# Patient Record
Sex: MALE | Race: WHITE | NOT HISPANIC OR LATINO | URBAN - METROPOLITAN AREA
[De-identification: names, ages, dates, MRNs, and addresses within clinical notes are randomized per-mention and may not be internally consistent; named-entity substitution may affect disease eponyms.]

---

## 2017-07-12 ENCOUNTER — HOSPITAL ENCOUNTER (OUTPATIENT)
Dept: CT IMAGING | Facility: CLINIC | Age: 82
Discharge: HOME | End: 2017-07-12
Attending: PREVENTIVE MEDICINE | Admitting: PREVENTIVE MEDICINE

## 2017-07-12 DIAGNOSIS — Z00.6 RESEARCH EXAM: ICD-10-CM

## 2017-07-12 PROCEDURE — 71260 CT THORAX DX C+: CPT | Mod: TC

## 2021-02-10 ENCOUNTER — RECORDS - HEALTHEAST (OUTPATIENT)
Dept: LAB | Facility: CLINIC | Age: 86
End: 2021-02-10

## 2021-02-10 ENCOUNTER — AMBULATORY - HEALTHEAST (OUTPATIENT)
Dept: ADMINISTRATIVE | Facility: CLINIC | Age: 86
End: 2021-02-10

## 2021-02-10 ENCOUNTER — OFFICE VISIT - HEALTHEAST (OUTPATIENT)
Dept: GERIATRICS | Facility: CLINIC | Age: 86
End: 2021-02-10

## 2021-02-10 DIAGNOSIS — S82.891D CLOSED FRACTURE OF RIGHT ANKLE WITH ROUTINE HEALING, SUBSEQUENT ENCOUNTER: ICD-10-CM

## 2021-02-10 DIAGNOSIS — R30.0 BURNING WITH URINATION: ICD-10-CM

## 2021-02-10 DIAGNOSIS — R52 PAIN MANAGEMENT: ICD-10-CM

## 2021-02-10 DIAGNOSIS — R53.81 PHYSICAL DECONDITIONING: ICD-10-CM

## 2021-02-10 RX ORDER — ACETAMINOPHEN 325 MG/1
650 TABLET ORAL EVERY 6 HOURS PRN
Status: SHIPPED | COMMUNITY
Start: 2021-02-10

## 2021-02-10 RX ORDER — CARBOXYMETHYLCELLULOSE SODIUM 5 MG/ML
2 SOLUTION/ DROPS OPHTHALMIC 4 TIMES DAILY
Status: SHIPPED | COMMUNITY
Start: 2021-02-10

## 2021-02-10 RX ORDER — FOLIC ACID 1 MG/1
1 TABLET ORAL DAILY
Status: SHIPPED | COMMUNITY
Start: 2021-02-10

## 2021-02-10 RX ORDER — OMEGA-3-ACID ETHYL ESTERS 1 G/1
1 CAPSULE, LIQUID FILLED ORAL DAILY
Status: SHIPPED | COMMUNITY
Start: 2021-02-10

## 2021-02-10 RX ORDER — BRIMONIDINE TARTRATE AND TIMOLOL MALEATE 2; 5 MG/ML; MG/ML
1 SOLUTION OPHTHALMIC 2 TIMES DAILY
Status: SHIPPED | COMMUNITY
Start: 2021-02-10

## 2021-02-10 RX ORDER — AMOXICILLIN 250 MG
1 CAPSULE ORAL 2 TIMES DAILY
Status: SHIPPED | COMMUNITY
Start: 2021-02-10

## 2021-02-10 RX ORDER — PYRIDOXINE HCL (VITAMIN B6) 50 MG
50 TABLET ORAL DAILY
Status: SHIPPED | COMMUNITY
Start: 2021-02-10

## 2021-02-11 LAB
ANION GAP SERPL CALCULATED.3IONS-SCNC: 4 MMOL/L (ref 5–18)
BUN SERPL-MCNC: 26 MG/DL (ref 8–28)
CALCIUM SERPL-MCNC: 8.3 MG/DL (ref 8.5–10.5)
CHLORIDE BLD-SCNC: 108 MMOL/L (ref 98–107)
CO2 SERPL-SCNC: 26 MMOL/L (ref 22–31)
CREAT SERPL-MCNC: 0.83 MG/DL (ref 0.7–1.3)
ERYTHROCYTE [DISTWIDTH] IN BLOOD BY AUTOMATED COUNT: 12.8 % (ref 11–14.5)
GFR SERPL CREATININE-BSD FRML MDRD: >60 ML/MIN/1.73M2
GLUCOSE BLD-MCNC: 107 MG/DL (ref 70–125)
HBA1C MFR BLD: 5.6 %
HCT VFR BLD AUTO: 33.2 % (ref 40–54)
HGB BLD-MCNC: 10.9 G/DL (ref 14–18)
MCH RBC QN AUTO: 29.7 PG (ref 27–34)
MCHC RBC AUTO-ENTMCNC: 32.8 G/DL (ref 32–36)
MCV RBC AUTO: 91 FL (ref 80–100)
PLATELET # BLD AUTO: 105 THOU/UL (ref 140–440)
PMV BLD AUTO: 11.4 FL (ref 8.5–12.5)
POTASSIUM BLD-SCNC: 4.3 MMOL/L (ref 3.5–5)
RBC # BLD AUTO: 3.67 MILL/UL (ref 4.4–6.2)
SODIUM SERPL-SCNC: 138 MMOL/L (ref 136–145)
WBC: 3.3 THOU/UL (ref 4–11)

## 2021-02-12 ENCOUNTER — COMMUNICATION - HEALTHEAST (OUTPATIENT)
Dept: GERIATRICS | Facility: CLINIC | Age: 86
End: 2021-02-12

## 2021-02-12 LAB — 25(OH)D3 SERPL-MCNC: 80.7 NG/ML (ref 30–80)

## 2021-02-15 ENCOUNTER — OFFICE VISIT - HEALTHEAST (OUTPATIENT)
Dept: GERIATRICS | Facility: CLINIC | Age: 86
End: 2021-02-15

## 2021-02-15 ENCOUNTER — COMMUNICATION - HEALTHEAST (OUTPATIENT)
Dept: GERIATRICS | Facility: CLINIC | Age: 86
End: 2021-02-15

## 2021-02-15 DIAGNOSIS — R52 PAIN MANAGEMENT: ICD-10-CM

## 2021-02-15 DIAGNOSIS — R53.81 PHYSICAL DECONDITIONING: ICD-10-CM

## 2021-02-15 DIAGNOSIS — R22.1 LUMP IN NECK: ICD-10-CM

## 2021-02-15 DIAGNOSIS — S82.891D CLOSED FRACTURE OF RIGHT ANKLE WITH ROUTINE HEALING, SUBSEQUENT ENCOUNTER: ICD-10-CM

## 2021-02-16 ENCOUNTER — OFFICE VISIT - HEALTHEAST (OUTPATIENT)
Dept: GERIATRICS | Facility: CLINIC | Age: 86
End: 2021-02-16

## 2021-02-16 DIAGNOSIS — S82.891D CLOSED FRACTURE OF RIGHT ANKLE WITH ROUTINE HEALING, SUBSEQUENT ENCOUNTER: ICD-10-CM
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
## 2021-02-16 DIAGNOSIS — N40.0 BENIGN PROSTATIC HYPERPLASIA, UNSPECIFIED WHETHER LOWER URINARY TRACT SYMPTOMS PRESENT: ICD-10-CM

## 2021-02-16 DIAGNOSIS — H40.9 GLAUCOMA, UNSPECIFIED GLAUCOMA TYPE, UNSPECIFIED LATERALITY: ICD-10-CM

## 2021-02-22 ENCOUNTER — OFFICE VISIT - HEALTHEAST (OUTPATIENT)
Dept: GERIATRICS | Facility: CLINIC | Age: 86
End: 2021-02-22

## 2021-02-22 ENCOUNTER — RECORDS - HEALTHEAST (OUTPATIENT)
Dept: LAB | Facility: CLINIC | Age: 86
End: 2021-02-22

## 2021-02-22 DIAGNOSIS — R53.1 WEAKNESS: ICD-10-CM

## 2021-02-22 DIAGNOSIS — R53.81 PHYSICAL DECONDITIONING: ICD-10-CM

## 2021-02-22 DIAGNOSIS — R52 PAIN MANAGEMENT: ICD-10-CM

## 2021-02-22 DIAGNOSIS — S82.891D CLOSED FRACTURE OF RIGHT ANKLE WITH ROUTINE HEALING, SUBSEQUENT ENCOUNTER: ICD-10-CM

## 2021-02-23 LAB
ERYTHROCYTE [DISTWIDTH] IN BLOOD BY AUTOMATED COUNT: 13.3 % (ref 11–14.5)
HCT VFR BLD AUTO: 32.9 % (ref 40–54)
HGB BLD-MCNC: 10.9 G/DL (ref 14–18)
MCH RBC QN AUTO: 30 PG (ref 27–34)
MCHC RBC AUTO-ENTMCNC: 33.1 G/DL (ref 32–36)
MCV RBC AUTO: 91 FL (ref 80–100)
PLATELET # BLD AUTO: 169 THOU/UL (ref 140–440)
PMV BLD AUTO: 10.4 FL (ref 8.5–12.5)
RBC # BLD AUTO: 3.63 MILL/UL (ref 4.4–6.2)
WBC: 4.2 THOU/UL (ref 4–11)

## 2021-02-24 ENCOUNTER — OFFICE VISIT - HEALTHEAST (OUTPATIENT)
Dept: GERIATRICS | Facility: CLINIC | Age: 86
End: 2021-02-24

## 2021-02-24 DIAGNOSIS — S82.891D CLOSED FRACTURE OF RIGHT ANKLE WITH ROUTINE HEALING, SUBSEQUENT ENCOUNTER: ICD-10-CM

## 2021-02-24 DIAGNOSIS — R52 PAIN MANAGEMENT: ICD-10-CM

## 2021-03-01 ENCOUNTER — AMBULATORY - HEALTHEAST (OUTPATIENT)
Dept: GERIATRICS | Facility: CLINIC | Age: 86
End: 2021-03-01

## 2021-06-05 VITALS
HEART RATE: 66 BPM | OXYGEN SATURATION: 96 % | SYSTOLIC BLOOD PRESSURE: 155 MMHG | BODY MASS INDEX: 23.76 KG/M2 | RESPIRATION RATE: 18 BRPM | WEIGHT: 160.9 LBS | TEMPERATURE: 99.1 F | DIASTOLIC BLOOD PRESSURE: 87 MMHG

## 2021-06-05 VITALS
HEART RATE: 58 BPM | BODY MASS INDEX: 23.75 KG/M2 | RESPIRATION RATE: 18 BRPM | DIASTOLIC BLOOD PRESSURE: 71 MMHG | WEIGHT: 160.8 LBS | OXYGEN SATURATION: 99 % | TEMPERATURE: 98.7 F | SYSTOLIC BLOOD PRESSURE: 142 MMHG

## 2021-06-05 VITALS
SYSTOLIC BLOOD PRESSURE: 139 MMHG | HEART RATE: 64 BPM | WEIGHT: 160.9 LBS | DIASTOLIC BLOOD PRESSURE: 74 MMHG | OXYGEN SATURATION: 98 % | RESPIRATION RATE: 18 BRPM | BODY MASS INDEX: 23.76 KG/M2 | TEMPERATURE: 98.2 F

## 2021-06-05 VITALS
DIASTOLIC BLOOD PRESSURE: 82 MMHG | SYSTOLIC BLOOD PRESSURE: 148 MMHG | OXYGEN SATURATION: 98 % | HEART RATE: 55 BPM | WEIGHT: 160.8 LBS | TEMPERATURE: 98.2 F | RESPIRATION RATE: 18 BRPM | BODY MASS INDEX: 23.75 KG/M2

## 2021-06-15 NOTE — PROGRESS NOTES
[facility]    Facility:   [facility] [[id number]]   Code Status: DNR      CHIEF COMPLAINT/REASON FOR VISIT:  Chief Complaint   Patient presents with     Review Of Multiple Medical Conditions     review VS, labs, F/U right ankle fracture , emperatriz management and reports of burning with urination        HISTORY:      HPI: Eliceo is a 89 y.o. male undergoing physical and occupational therapy at [facility]. He is with past medical history of BPH, T2DM, peripheral neuropathy, and glaucoma who presents as a direct admission from [facility] for evaluation of fall with concern for syncopal event who was found to have avulsion fracture or the right ankle.  Head and spine CT without acute pathology.    Today he is seen to review vital signs, labs, follow-up right ankle fracture, and pain management.  Patient denied chest pain shortness of breath, denied constipation diarrhea nausea vomiting cough or congestion.  He reports his pain is controlled.  Staff reported he was having burning with urination however he has denied this today.  He is a type II diabetic and currently not on medications.      Past Medical History:   Diagnosis Date     Anemia     pernicious     Arthritis     Bilateral hips     Avulsion fracture of lateral malleolus of right fibula      BPH (benign prostatic hypertrophy)      Cataract      Diabetes mellitus (H)     Type 2 DM     Diverticulitis of colon      Glaucoma      History of GI bleed      Hypertension      Peripheral neuropathy              Family History   Problem Relation Age of Onset     Other Mother         sepsis      Hypertension Father      Stroke Father      Seizures Sister      Melanoma Brother      Parkinsonism Brother      Depression Brother      Pacemaker Brother      Social History     Socioeconomic History     Marital status:      Spouse name: Not on file     Number of children: Not on file     Years of education: Not  on file     Highest education level: Not on file   Occupational History     Not on file   Social Needs     Financial resource strain: Not on file     Food insecurity     Worry: Not on file     Inability: Not on file     Transportation needs     Medical: Not on file     Non-medical: Not on file   Tobacco Use     Smoking status: Former Smoker     Quit date: 1945     Years since quittin.1     Smokeless tobacco: Never Used   Substance and Sexual Activity     Alcohol use: No     Drug use: No     Sexual activity: Not on file   Lifestyle     Physical activity     Days per week: Not on file     Minutes per session: Not on file     Stress: Not on file   Relationships     Social connections     Talks on phone: Not on file     Gets together: Not on file     Attends Scientologist service: Not on file     Active member of club or organization: Not on file     Attends meetings of clubs or organizations: Not on file     Relationship status: Not on file     Intimate partner violence     Fear of current or ex partner: Not on file     Emotionally abused: Not on file     Physically abused: Not on file     Forced sexual activity: Not on file   Other Topics Concern     Not on file   Social History Narrative     Not on file         Review of Systems   Constitutional: Positive for activity change. Negative for appetite change, chills, fatigue and fever.        Right ankle fracture - wearing a CAM boot   HENT: Negative for congestion and sore throat.    Eyes: Negative for visual disturbance.   Respiratory: Negative for cough, shortness of breath and wheezing.    Cardiovascular: Negative for chest pain and leg swelling.   Gastrointestinal: Negative for abdominal distention, abdominal pain, constipation, diarrhea and nausea.   Genitourinary: Negative for dysuria.   Musculoskeletal: Negative for arthralgias and myalgias.   Skin: Negative for color change, rash and wound.   Neurological: Negative for dizziness, weakness and numbness.    Psychiatric/Behavioral: Negative for agitation, behavioral problems and sleep disturbance.       Vitals:    02/10/21 1011   BP: 142/71   Pulse: (!) 58   Resp: 18   Temp: 98.7  F (37.1  C)   SpO2: 99%   Weight: 160 lb 12.8 oz (72.9 kg)       Physical Exam  Constitutional:       Appearance: He is well-developed.      Comments: Pleasant gentleman in no acuet distress   HENT:      Head: Normocephalic.   Eyes:      Conjunctiva/sclera: Conjunctivae normal.   Neck:      Musculoskeletal: Normal range of motion.   Cardiovascular:      Rate and Rhythm: Normal rate and regular rhythm.      Heart sounds: Normal heart sounds. No murmur.   Pulmonary:      Effort: No respiratory distress.      Breath sounds: Normal breath sounds. No wheezing or rales.   Abdominal:      General: Bowel sounds are normal. There is no distension.      Palpations: Abdomen is soft.      Tenderness: There is no abdominal tenderness.   Musculoskeletal: Normal range of motion.      Comments: CAM boot right foot    Skin:     General: Skin is warm.   Neurological:      Mental Status: He is alert and oriented to person, place, and time.   Psychiatric:         Behavior: Behavior normal.           LABS:   Recent Results (from the past 240 hour(s))   COVID-19 VIRUS (CORONAVIRUS) BY PCR - EXTERNAL RESULT    Specimen: Nasopharyngeal Swab    Specimen type and source: Swab (Source Required), Nasopharyngeal swab   Result Value Ref Range    COVID-19 Virus by PCR (External Result) Not Detected Not Detected   Basic Metabolic Panel   Result Value Ref Range    Sodium 138 136 - 145 mmol/L    Potassium 4.3 3.5 - 5.0 mmol/L    Chloride 108 (H) 98 - 107 mmol/L    CO2 26 22 - 31 mmol/L    Anion Gap, Calculation 4 (L) 5 - 18 mmol/L    Glucose 107 70 - 125 mg/dL    Calcium 8.3 (L) 8.5 - 10.5 mg/dL    BUN 26 8 - 28 mg/dL    Creatinine 0.83 0.70 - 1.30 mg/dL    GFR MDRD Af Amer >60 >60 mL/min/1.73m2    GFR MDRD Non Af Amer >60 >60 mL/min/1.73m2   Vitamin D, Total (25-Hydroxy)    Result Value Ref Range    Vitamin D, Total (25-Hydroxy) 80.7 (H) 30.0 - 80.0 ng/mL   HM2(CBC w/o Differential)   Result Value Ref Range    WBC 3.3 (L) 4.0 - 11.0 thou/uL    RBC 3.67 (L) 4.40 - 6.20 mill/uL    Hemoglobin 10.9 (L) 14.0 - 18.0 g/dL    Hematocrit 33.2 (L) 40.0 - 54.0 %    MCV 91 80 - 100 fL    MCH 29.7 27.0 - 34.0 pg    MCHC 32.8 32.0 - 36.0 g/dL    RDW 12.8 11.0 - 14.5 %    Platelets 105 (L) 140 - 440 thou/uL    MPV 11.4 8.5 - 12.5 fL   Glycosylated Hemoglobin A1C   Result Value Ref Range    Hemoglobin A1c 5.6 <=5.6 %     Current Outpatient Medications   Medication Sig     acetaminophen (TYLENOL) 325 MG tablet Take 650 mg by mouth every 6 (six) hours as needed for pain.     aspirin 81 MG EC tablet Take 81 mg by mouth daily.     brimonidine-timoloL (COMBIGAN) 0.2-0.5 % ophthalmic solution Administer 1 drop to both eyes 2 (two) times a day.      cyanocobalamin, vitamin B-12, 1,000 mcg Subl Place 3,000 mcg under the tongue daily.      finasteride (PROSCAR) 5 mg tablet Take 5 mg by mouth daily.     folic acid (FOLVITE) 1 MG tablet Take 1 mg by mouth daily.     omega-3 acid ethyl esters (LOVAZA) 1 gram capsule Take 1 g by mouth daily.     pyridoxine, vitamin B6, (B-6) 50 MG tablet Take 50 mg by mouth daily.     UNABLE TO FIND Med Name:Vit A beta carotene capsule 14856 unit one capsule daily for Vit A deficiency     bimatoprost (LUMIGAN) 0.01 % Drop Administer 1 drop to both eyes daily.     carboxymethylcellulose (REFRESH PLUS) 0.5 % Dpet ophthalmic dropperette Administer 2 drops to both eyes 4 (four) times a day.     senna-docusate (SENNOSIDES-DOCUSATE SODIUM) 8.6-50 mg tablet Take 1 tablet by mouth 2 (two) times a day.     ASSESSMENT:      ICD-10-CM    1. Closed fracture of right ankle with routine healing, subsequent encounter  S82.891D    2. Pain management  R52    3. Physical deconditioning  R53.81    4. Burning with urination  R30.0        PLAN:    Type 2 diabetes- not on medications A1C on 2/11/21  was 5.6     Peripheral neuropathy not on medications     BPH on Proscar    Glaucoma Combigan    Right ankle avulsion placed in a cam boot conservative management. Patient placed in CAM boot. F/U with orthopedics and podiatry    Pain management  PRN Tylenol    Anemia- on folic and B6 HGB on 2/11/21 10.9     Electronically signed by: Karen Santacruz CNP

## 2021-06-15 NOTE — PROGRESS NOTES
[facility]    Facility:   [facility] [[id number]]   Code Status: DNR      CHIEF COMPLAINT/REASON FOR VISIT:  Chief Complaint   Patient presents with     Review Of Multiple Medical Conditions     review Labs F/U right foot wound, monitormrightnneck lump        HISTORY:      HPI: Eliceo is a 89 y.o. male undergoing physical and occupational therapy at [facility]. He is with past medical history of BPH, T2DM, peripheral neuropathy, and glaucoma who presents as a direct admission from [facility] for evaluation of fall with concern for syncopal event who was found to have avulsion fracture or the right ankle.  Head and spine CT without acute pathology.    Today he is seen to review vital signs, labs, follow-up right ankle fracture, follow-up for reports of lump right side of neck and pain management.  Patient noted to have a lump on his right side of the neck.  He reports he has had it there for a couple of years and has had it drained in the past.  He denies any pain related to the lump.  Patient denied chest pain shortness of breath, denied constipation diarrhea nausea vomiting cough or congestion.  He reports his pain is controlled.  He is a type II diabetic and currently not on medications.  He reports his pain is controlled.  He does have numbness right foot and reports this is not new he was able to feel me touching his toes and can wiggle them.  He is participating in therapy.  He is with thrombocytopenia however labs are improving.      Past Medical History:   Diagnosis Date     Anemia     pernicious     Arthritis     Bilateral hips     Avulsion fracture of lateral malleolus of right fibula      BPH (benign prostatic hypertrophy)      Cataract      Diabetes mellitus (H)     Type 2 DM     Diverticulitis of colon      Glaucoma      History of GI bleed      Hypertension      Peripheral neuropathy              Family History   Problem Relation Age of  Onset     Other Mother         sepsis      Hypertension Father      Stroke Father      Seizures Sister      Melanoma Brother      Parkinsonism Brother      Depression Brother      Pacemaker Brother      Social History     Socioeconomic History     Marital status:      Spouse name: Not on file     Number of children: Not on file     Years of education: Not on file     Highest education level: Not on file   Occupational History     Not on file   Social Needs     Financial resource strain: Not on file     Food insecurity     Worry: Not on file     Inability: Not on file     Transportation needs     Medical: Not on file     Non-medical: Not on file   Tobacco Use     Smoking status: Former Smoker     Quit date: 1945     Years since quittin.1     Smokeless tobacco: Never Used   Substance and Sexual Activity     Alcohol use: No     Drug use: No     Sexual activity: Not on file   Lifestyle     Physical activity     Days per week: Not on file     Minutes per session: Not on file     Stress: Not on file   Relationships     Social connections     Talks on phone: Not on file     Gets together: Not on file     Attends [employer] service: Not on file     Active member of club or organization: Not on file     Attends meetings of clubs or organizations: Not on file     Relationship status: Not on file     Intimate partner violence     Fear of current or ex partner: Not on file     Emotionally abused: Not on file     Physically abused: Not on file     Forced sexual activity: Not on file   Other Topics Concern     Not on file   Social History Narrative     Not on file         Review of Systems   Constitutional: Positive for activity change. Negative for appetite change, chills, fatigue and fever.        Right ankle fracture - wearing a CAM boot   HENT: Negative for congestion and sore throat.    Eyes: Negative for visual disturbance.   Respiratory: Negative for cough, shortness of breath and wheezing.     Cardiovascular: Negative for chest pain and leg swelling.   Gastrointestinal: Negative for abdominal distention, abdominal pain, constipation, diarrhea and nausea.   Genitourinary: Negative for dysuria.   Musculoskeletal: Negative for arthralgias and myalgias.   Skin: Negative for color change, rash and wound.   Neurological: Negative for dizziness, weakness and numbness.   Psychiatric/Behavioral: Negative for agitation, behavioral problems and sleep disturbance.       Vitals:    02/15/21 1107   BP: 148/82   Pulse: (!) 55   Resp: 18   Temp: 98.2  F (36.8  C)   SpO2: 98%   Weight: 160 lb 12.8 oz (72.9 kg)       Physical Exam  Constitutional:       Appearance: He is well-developed.      Comments: Pleasant gentleman in no acuet distress   HENT:      Head: Normocephalic.   Eyes:      Conjunctiva/sclera: Conjunctivae normal.   Neck:      Musculoskeletal: Normal range of motion.   Cardiovascular:      Rate and Rhythm: Normal rate and regular rhythm.      Heart sounds: Normal heart sounds. No murmur.   Pulmonary:      Effort: No respiratory distress.      Breath sounds: Normal breath sounds. No wheezing or rales.   Abdominal:      General: Bowel sounds are normal. There is no distension.      Palpations: Abdomen is soft.      Tenderness: There is no abdominal tenderness.   Musculoskeletal: Normal range of motion.      Comments: CAM boot right foot    Skin:     General: Skin is warm.      Comments: Lump right side of neck   Neurological:      Mental Status: He is alert and oriented to person, place, and time.   Psychiatric:         Behavior: Behavior normal.           LABS:   Recent Results (from the past 240 hour(s))   COVID-19 VIRUS (CORONAVIRUS) BY PCR - EXTERNAL RESULT    Specimen: Nasopharyngeal Swab    Specimen type and source: Swab (Source Required), Nasopharyngeal swab   Result Value Ref Range    COVID-19 Virus by PCR (External Result) Not Detected Not Detected   Basic Metabolic Panel   Result Value Ref Range     Sodium 138 136 - 145 mmol/L    Potassium 4.3 3.5 - 5.0 mmol/L    Chloride 108 (H) 98 - 107 mmol/L    CO2 26 22 - 31 mmol/L    Anion Gap, Calculation 4 (L) 5 - 18 mmol/L    Glucose 107 70 - 125 mg/dL    Calcium 8.3 (L) 8.5 - 10.5 mg/dL    BUN 26 8 - 28 mg/dL    Creatinine 0.83 0.70 - 1.30 mg/dL    GFR MDRD Af Amer >60 >60 mL/min/1.73m2    GFR MDRD Non Af Amer >60 >60 mL/min/1.73m2   Vitamin D, Total (25-Hydroxy)   Result Value Ref Range    Vitamin D, Total (25-Hydroxy) 80.7 (H) 30.0 - 80.0 ng/mL   HM2(CBC w/o Differential)   Result Value Ref Range    WBC 3.3 (L) 4.0 - 11.0 thou/uL    RBC 3.67 (L) 4.40 - 6.20 mill/uL    Hemoglobin 10.9 (L) 14.0 - 18.0 g/dL    Hematocrit 33.2 (L) 40.0 - 54.0 %    MCV 91 80 - 100 fL    MCH 29.7 27.0 - 34.0 pg    MCHC 32.8 32.0 - 36.0 g/dL    RDW 12.8 11.0 - 14.5 %    Platelets 105 (L) 140 - 440 thou/uL    MPV 11.4 8.5 - 12.5 fL   Glycosylated Hemoglobin A1C   Result Value Ref Range    Hemoglobin A1c 5.6 <=5.6 %     Current Outpatient Medications   Medication Sig     acetaminophen (TYLENOL) 325 MG tablet Take 650 mg by mouth every 6 (six) hours as needed for pain.     aspirin 81 MG EC tablet Take 81 mg by mouth daily.     bimatoprost (LUMIGAN) 0.01 % Drop Administer 1 drop to both eyes daily.     brimonidine-timoloL (COMBIGAN) 0.2-0.5 % ophthalmic solution Administer 1 drop to both eyes 2 (two) times a day.      carboxymethylcellulose (REFRESH PLUS) 0.5 % Dpet ophthalmic dropperette Administer 2 drops to both eyes 4 (four) times a day.     cyanocobalamin, vitamin B-12, 1,000 mcg Subl Place 3,000 mcg under the tongue daily.      finasteride (PROSCAR) 5 mg tablet Take 5 mg by mouth daily.     folic acid (FOLVITE) 1 MG tablet Take 1 mg by mouth daily.     omega-3 acid ethyl esters (LOVAZA) 1 gram capsule Take 1 g by mouth daily.     pyridoxine, vitamin B6, (B-6) 50 MG tablet Take 50 mg by mouth daily.     senna-docusate (SENNOSIDES-DOCUSATE SODIUM) 8.6-50 mg tablet Take 1 tablet by mouth 2  (two) times a day.     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 Lump in neck  R22.1     rightside   2. Closed fracture of right ankle with routine healing, subsequent encounter  S82.891D    3. Physical deconditioning  R53.81    4. Pain management  R52        PLAN:    Lump right side of neck ultrasound    Type 2 diabetes- not on medications A1C on 2/11/21 was 5.6     Peripheral neuropathy not on medications     BPH on Proscar    Glaucoma Combigan    Right ankle avulsion placed in a cam boot conservative management. Patient placed in CAM boot. F/U with orthopedics and podiatry    Pain management  PRN Tylenol    Anemia- on folic and B6 HGB on 2/11/21 10.9     Electronically signed by: Karen Santacruz CNP

## 2021-06-15 NOTE — PROGRESS NOTES
[facility]    Facility:   [facility] [[id number]]   Code Status: DNR      CHIEF COMPLAINT/REASON FOR VISIT:  Chief Complaint   Patient presents with     Review Of Multiple Medical Conditions     review VS, labs, F/U right foot fracture        HISTORY:      HPI: Eliceo is a 89 y.o. male undergoing physical and occupational therapy at [facility]. He is with past medical history of BPH, T2DM, peripheral neuropathy, and glaucoma who presents as a direct admission from [facility] for evaluation of fall with concern for syncopal event who was found to have avulsion fracture or the right ankle.  Head and spine CT without acute pathology.    Today he is seen to review vital signs, labs, follow-up right ankle fracture,and pain management.   Patient denied chest pain shortness of breath, denied constipation diarrhea nausea vomiting cough or congestion.  He reports his pain is controlled.  He does report feeling weak.  Appetite is good he is eating 100%.  He is a type II diabetic and currently not on medications. He does have numbness right foot and reports this is not new he was able to feel me touching his toes and can wiggle them.  He is participating in therapy.  He is with thrombocytopenia however labs are improving. Last HGB 2/11/21 was 10.9.       Past Medical History:   Diagnosis Date     Anemia     pernicious     Arthritis     Bilateral hips     Avulsion fracture of lateral malleolus of right fibula      BPH (benign prostatic hypertrophy)      Cataract      Diabetes mellitus (H)     Type 2 DM     Diverticulitis of colon      Glaucoma      History of GI bleed      Hypertension      Peripheral neuropathy              Family History   Problem Relation Age of Onset     Other Mother         sepsis      Hypertension Father      Stroke Father      Seizures Sister      Melanoma Brother      Parkinsonism Brother      Depression Brother      Pacemaker Brother       Social History     Socioeconomic History     Marital status:      Spouse name: Not on file     Number of children: Not on file     Years of education: Not on file     Highest education level: Not on file   Occupational History     Not on file   Social Needs     Financial resource strain: Not on file     Food insecurity     Worry: Not on file     Inability: Not on file     Transportation needs     Medical: Not on file     Non-medical: Not on file   Tobacco Use     Smoking status: Former Smoker     Quit date: 1945     Years since quittin.1     Smokeless tobacco: Never Used   Substance and Sexual Activity     Alcohol use: No     Drug use: No     Sexual activity: Not on file   Lifestyle     Physical activity     Days per week: Not on file     Minutes per session: Not on file     Stress: Not on file   Relationships     Social connections     Talks on phone: Not on file     Gets together: Not on file     Attends Sikh service: Not on file     Active member of club or organization: Not on file     Attends meetings of clubs or organizations: Not on file     Relationship status: Not on file     Intimate partner violence     Fear of current or ex partner: Not on file     Emotionally abused: Not on file     Physically abused: Not on file     Forced sexual activity: Not on file   Other Topics Concern     Not on file   Social History Narrative     Not on file         Review of Systems   Constitutional: Positive for activity change. Negative for appetite change, chills, fatigue and fever.        Right ankle fracture - wearing a CAM boot   HENT: Negative for congestion and sore throat.    Eyes: Negative for visual disturbance.   Respiratory: Negative for cough, shortness of breath and wheezing.    Cardiovascular: Negative for chest pain and leg swelling.   Gastrointestinal: Negative for abdominal distention, abdominal pain, constipation, diarrhea and nausea.   Genitourinary: Negative for dysuria.    Musculoskeletal: Negative for arthralgias and myalgias.   Skin: Negative for color change, rash and wound.   Neurological: Negative for dizziness, weakness and numbness.   Psychiatric/Behavioral: Negative for agitation, behavioral problems and sleep disturbance.       Vitals:    02/22/21 1026   BP: 139/74   Pulse: 64   Resp: 18   Temp: 98.2  F (36.8  C)   SpO2: 98%   Weight: 160 lb 14.4 oz (73 kg)       Physical Exam  Constitutional:       Appearance: He is well-developed.      Comments: Pleasant gentleman in no acuet distress   HENT:      Head: Normocephalic.   Eyes:      Conjunctiva/sclera: Conjunctivae normal.   Neck:      Musculoskeletal: Normal range of motion.   Cardiovascular:      Rate and Rhythm: Normal rate and regular rhythm.      Heart sounds: Normal heart sounds. No murmur.   Pulmonary:      Effort: No respiratory distress.      Breath sounds: Normal breath sounds. No wheezing or rales.   Abdominal:      General: Bowel sounds are normal. There is no distension.      Palpations: Abdomen is soft.      Tenderness: There is no abdominal tenderness.   Musculoskeletal: Normal range of motion.      Comments: CAM boot right foot    Skin:     General: Skin is warm.      Comments: Lump right side of neck   Neurological:      Mental Status: He is alert and oriented to person, place, and time.   Psychiatric:         Behavior: Behavior normal.           LABS:   No results found for this or any previous visit (from the past 240 hour(s)).  Current Outpatient Medications   Medication Sig     acetaminophen (TYLENOL) 325 MG tablet Take 650 mg by mouth every 6 (six) hours as needed for pain.     aspirin 81 MG EC tablet Take 81 mg by mouth daily.     bimatoprost (LUMIGAN) 0.01 % Drop Administer 1 drop to both eyes daily.     brimonidine-timoloL (COMBIGAN) 0.2-0.5 % ophthalmic solution Administer 1 drop to both eyes 2 (two) times a day.      carboxymethylcellulose (REFRESH PLUS) 0.5 % Dpet ophthalmic dropperette  Administer 2 drops to both eyes 4 (four) times a day.     cyanocobalamin, vitamin B-12, 1,000 mcg Subl Place 3,000 mcg under the tongue daily.      finasteride (PROSCAR) 5 mg tablet Take 5 mg by mouth daily.     folic acid (FOLVITE) 1 MG tablet Take 1 mg by mouth daily.     omega-3 acid ethyl esters (LOVAZA) 1 gram capsule Take 1 g by mouth daily.     pyridoxine, vitamin B6, (B-6) 50 MG tablet Take 50 mg by mouth daily.     senna-docusate (SENNOSIDES-DOCUSATE SODIUM) 8.6-50 mg tablet Take 1 tablet by mouth 2 (two) times a day.     UNABLE TO FIND Med Name:Vit A beta carotene capsule 86361 unit one capsule daily for Vit A deficiency     ASSESSMENT:      ICD-10-CM    1. Closed fracture of right ankle with routine healing, subsequent encounter  S82.891D    2. Physical deconditioning  R53.81    3. Pain management  R52    4. Weakness  R53.1        PLAN:    Lump right side of neck ultrasound shows superficial cyst- follow up with dermatology outpatient     Type 2 diabetes- not on medications A1C on 2/11/21 was 5.6     Peripheral neuropathy not on medications     BPH on Proscar    Glaucoma Combigan    Right ankle avulsion placed in a cam boot conservative management. Patient placed in CAM boot. F/U with orthopedics and podiatry    Pain management  PRN Tylenol    Anemia- on folic and B6 HGB on 2/11/21 10.9, monitor labs     Electronically signed by: Karen Santacruz CNP

## 2021-06-15 NOTE — PROGRESS NOTES
[facility]    Facility:   [facility] [[id number]]   Code Status: DNR  PCP: Grisel Tillman MD   Phone: None   Fax: None      CHIEF COMPLAINT/REASON FOR VISIT:  Chief Complaint   Patient presents with     Discharge Summary       HISTORY COURSE:  Eliceo is a 89 y.o. male undergoing physical and occupational therapy at [facility]. He is with past medical history of BPH, T2DM, peripheral neuropathy, and glaucoma who presents as a direct admission from [facility] for evaluation of fall with concern for syncopal event who was found to have avulsion fracture or the right ankle.  Head and spine CT without acute pathology.     Today he is seen to review vital signs, , follow-up right ankle fracture pain management and face-to-face for discharge patient will discharge to home on 2/26/2021 with current medications and treatments.  He will have [facility] home care services PT OT home health aide RN and speech.  He is standing for 5 minutes he does have a cam boot  right  foot  Patient denied chest pain shortness of breath, denied constipation diarrhea nausea vomiting cough or congestion.  He reports his pain is controlled.  Appetite is good he is eating 100%.  He is a type II diabetic and currently not on medications. He does have numbness right foot and reports this is not new.   He is participating in therapy.  He is with thrombocytopenia however labs are improving. Last HGB 2/11/21 was 10.9.     Review of Systems  Constitutional: Positive for activity change. Negative for appetite change, chills, fatigue and fever.        Right ankle fracture - wearing a CAM boot   HENT: Negative for congestion and sore throat.    Eyes: Negative for visual disturbance.   Respiratory: Negative for cough, shortness of breath and wheezing.    Cardiovascular: Negative for chest pain and leg swelling.   Gastrointestinal: Negative for abdominal distention, abdominal pain, constipation,  diarrhea and nausea.   Genitourinary: Negative for dysuria.   Musculoskeletal: Negative for arthralgias and myalgias.   Skin: Negative for color change, rash and wound.   Neurological: Negative for dizziness, weakness and numbness.   Psychiatric/Behavioral: Negative for agitation, behavioral problems and sleep disturbance.      Vitals:    02/24/21 1028   BP: 155/87   Pulse: 66   Resp: 18   Temp: 99.1  F (37.3  C)   SpO2: 96%   Weight: 160 lb 14.4 oz (73 kg)       Physical Exam  Constitutional:       Appearance: He is well-developed.      Comments: Pleasant gentleman in no acuet distress   HENT:      Head: Normocephalic.   Eyes:      Conjunctiva/sclera: Conjunctivae normal.   Neck:      Musculoskeletal: Normal range of motion.   Cardiovascular:      Rate and Rhythm: Normal rate and regular rhythm.      Heart sounds: Normal heart sounds. No murmur.   Pulmonary:      Effort: No respiratory distress.      Breath sounds: Normal breath sounds. No wheezing or rales.   Abdominal:      General: Bowel sounds are normal. There is no distension.      Palpations: Abdomen is soft.      Tenderness: There is no abdominal tenderness.   Musculoskeletal: Normal range of motion.      Comments: CAM boot right foot    Skin:     General: Skin is warm.      Comments: Lump right side of neck   Neurological:      Mental Status: He is alert and oriented to person, place, and time.   Psychiatric:         Behavior: Behavior normal.   MEDICATION LIST:  Current Outpatient Medications   Medication Sig     acetaminophen (TYLENOL) 325 MG tablet Take 650 mg by mouth every 6 (six) hours as needed for pain.     aspirin 81 MG EC tablet Take 81 mg by mouth daily.     bimatoprost (LUMIGAN) 0.01 % Drop Administer 1 drop to both eyes daily.     brimonidine-timoloL (COMBIGAN) 0.2-0.5 % ophthalmic solution Administer 1 drop to both eyes 2 (two) times a day.      carboxymethylcellulose (REFRESH PLUS) 0.5 % Dpet ophthalmic dropperette Administer 2 drops to both  eyes 4 (four) times a day.     cyanocobalamin, vitamin B-12, 1,000 mcg Subl Place 3,000 mcg under the tongue daily.      finasteride (PROSCAR) 5 mg tablet Take 5 mg by mouth daily.     folic acid (FOLVITE) 1 MG tablet Take 1 mg by mouth daily.     omega-3 acid ethyl esters (LOVAZA) 1 gram capsule Take 1 g by mouth daily.     pyridoxine, vitamin B6, (B-6) 50 MG tablet Take 50 mg by mouth daily.     senna-docusate (SENNOSIDES-DOCUSATE SODIUM) 8.6-50 mg tablet Take 1 tablet by mouth 2 (two) times a day.     UNABLE TO FIND Med Name:Vit A beta carotene capsule 25666 unit one capsule daily for Vit A deficiency       DISCHARGE DIAGNOSIS:    ICD-10-CM    1. Closed fracture of right ankle with routine healing, subsequent encounter  S82.891D    2. Pain management  R52         Lump right side of neck ultrasound shows superficial cyst- follow up with dermatology outpatient      Type 2 diabetes- not on medications A1C on 2/11/21 was 5.6     Peripheral neuropathy not on medications     BPH on Proscar    Glaucoma Combigan     Right ankle avulsion placed in a cam boot conservative management. Patient placed in CAM boot. F/U with orthopedics and podiatry     Pain management  PRN Tylenol     Anemia- on folic and B6 HGB on 2/11/21 10.9, monitor labs     MEDICAL EQUIPMENT NEEDS:  Walker     DISCHARGE PLAN/FACE TO FACE:  I certify that services are/were furnished while this patient was under the care of a physician and that a physician or an allowed non-physician practitioner (NPP), had a face-to-face encounter that meets the physician face-to-face encounter requirements. The encounter was in whole, or in part, related to the primary reason for home health. The patient is confined to his/her home and needs intermittent skilled nursing, physical therapy, speech-language pathology, or the continued need for occupational therapy. A plan of care has been established by a physician and is periodically reviewed by a physician.  Date of  Face-to-Face Encounter: 2/24/21    I certify that, based on my findings, the following services are medically necessary home health services: PT OT home health aide RN and  and speech    My clinical findings support the need for the above skilled services because: PT OT for continued strength and endurance, home health aide to assist with activities of daily living, RN for medication management vital signs, and monitoring of CMS right foot, speech for cognition.    This patient is homebound because: He is deconditioned following a right ankle fracture    The patient is, or has been, under my care and I have initiated the establishment of the plan of care. This patient will be followed by a physician who will periodically review the plan of care.    Schedule follow up visit with primary care provider within 7 days to reestablish care.    Electronically signed by: Karen Santacruz CNP

## 2021-06-15 NOTE — PROGRESS NOTES
[facility]      Facility:    [facility] [[id number]]  Code Status: DNR      Chief Complaint/Reason for Visit:  Chief Complaint   Patient presents with     H & P     MD Note to TCU for right ankle avulsion fx after a fall, possible syncope.        HPI:   Eliceo is a 89 y.o. male with hx of BPH, glaucoma, presented to the hospital on 2/6/2021 after a fall at home in which he reported he passed out, concern for syncopal event. He was noted to have right ankle avulsion fracture. He was admitted as noted below.     Hospital Course:   Brief HPI:   89 y.o. male with past medical history of BPH, T2DM, peripheral neuropathy, and glaucoma who presents as a direct admission from [facility] for evaluation of fall with concern for syncopal event who was found to have avulsion fracture or the right ankle. Patient was walking to his table this morning when he suffered a fall. Patient reports passing out. He denies any preceding symptoms such as dizziness, CP, HA or SOB. He does not think he hit his head, but is unsure. He denies use of blood thinners. He c/o back pain after falling. Patient reports that he had a syncopal event 3 years ago, but is unsure if much workup was done at that time. Per chart review it appears he had a 24 hour holter monitor, but I am unable to review the results for this in care everywhere.     On arrival to the ED patient was hypertensive to 150's, which subsequently improved. Other VSS. Afebrile. Per report EKG with NSR. Lab evaluation with stable BMP. CBC with WBC 2.5, otherwise stable. Troponin 0.02. CT head negative for acute pathology. CT cspine negative for acute injury. XR right ankle with avulsion fracture of lateral malleolus. Patient was discussed with orthopedic surgery who recommended conservative management. Patient was placed in a CAM boot, and attempted ambulation trial. Patient noted to be very unsteady on his feet, and felt unsafe to  return home. Patient was transferred to [place] for admission to medicine, and ongoing cares.     Fall from standing  Syncope  Patient fell when walking to his table this morning. No preceding symptoms. Patient reports passing out.  EKG with NSR. Troponin 0.02. BMP stable. CBC with WBC 2.5, otherwise stable. CT head and cspine negative. Imaging of bilateral ankles, left arm and pelvis negative except for avulsion fracture to right ankle. Ankle injury non-operative, and patient was placed in CAM boot. Plan was to discharge home, however patient unsafe to ambulate independently at home.   --PT/OT evaluation. They suggested TCU  --Falls precautions.   --Telemetry monitoring no signs of arrhythmia    --No significant orthostatic BP      Right lateral malleolus avulsion fracture--XR right ankle with avulsion fracture. Orthopedic surgery consulted in the ED and recommending conservative management. Patient placed in CAM boot.   --Continue CAM boot.   --PT/OT evaluation.   --Follow up Ortho/Podiatry after dc       Chronic Issues:  T2DM--Patient reports he does not take any medications for this.   --ACcuchecks AC and HS  --SSI while hospitalized and minimal need of supplemental Insulin.  --Hypoglycemia protocol did not show any concern for hypoglycemia.      Peripheral neuropathy--patient reports hx of decreased sensation to BLE. No change. Reports he does not take medications for this.      BPH--patient reports taking medication for this, but does not remember name or dose. Will ask pharmacy to assist with med reconciliation.      Glaucoma--Patient reports taking eye gtts every night.     Overall stabilized and discharged to TCU on 2/9/2021 for PT, OT, nursing cares, medical management and monitoring.     Today:  He has a CAM boot for right ankle, WBAT with boot on. Working with therapy. Denies any significant pain. Recommendations to follow up with ortho 2 weeks post hospital. He was evaluated for arrhythmia in the  hospital, none noted. He denies feelings of dizziness or syncope here. No headaches, nausea, vomiting, chest pain or shortness of breath. He does have impaired vision from glaucoma, on eye drops, no new change in vision. Also hearing impaired, no new concerns. He lives alone at baseline, . He reports good appetite. No diarrhea, constipation abdominal pain. Hx of BPH on finasteride, no difficulty urinating. No fever or cough.       Past Medical History:  Past Medical History:   Diagnosis Date     Anemia     pernicious     Arthritis     Bilateral hips     Avulsion fracture of lateral malleolus of right fibula      BPH (benign prostatic hypertrophy)      Cataract      Diabetes mellitus (H)     Type 2 DM     Diverticulitis of colon      Glaucoma      History of GI bleed      Hypertension      Peripheral neuropathy            Surgical History:  Past Surgical History:   Procedure Laterality Date     CATARACT EXTRACTION W/  INTRAOCULAR LENS IMPLANT Left 6/18/2015    Procedure: LEFT CATARACT EXTRACTION BY PHACOEMULSIFICATION WITH AN INTRAOCULAR LENS IMPLANT;  Surgeon: Michel Lancaster MD;  Location: [facility];  Service:      CATARACT EXTRACTION W/  INTRAOCULAR LENS IMPLANT Right 11/5/2015    Procedure: RIGHT CATARACT EXTRACTION BY PHACOEMULSIFICATION WITH AN INTRAOCULAR LENS IMPLANT;  Surgeon: Michel Lancaster MD;  Location: [facility];  Service:      CHOLECYSTECTOMY       COLONOSCOPY       HERNIA REPAIR       KS REMV CATARACT EXTRACAP,INSERT LENS  6/18/2015          KS REMV CATARACT EXTRACAP,INSERT LENS  11/5/2015            Family History:   Family History   Problem Relation Age of Onset     Other Mother         sepsis      Hypertension Father      Stroke Father      Seizures Sister      Melanoma Brother      Parkinsonism Brother      Depression Brother      Pacemaker Brother        Social History:    Social History     Socioeconomic History     Marital status:      Spouse name: Not on file     Number  of children: Not on file     Years of education: Not on file     Highest education level: Not on file   Occupational History     Not on file   Social Needs     Financial resource strain: Not on file     Food insecurity     Worry: Not on file     Inability: Not on file     Transportation needs     Medical: Not on file     Non-medical: Not on file   Tobacco Use     Smoking status: Former Smoker     Quit date: 1945     Years since quittin.2     Smokeless tobacco: Never Used   Substance and Sexual Activity     Alcohol use: No     Drug use: No     Sexual activity: Not on file   Lifestyle     Physical activity     Days per week: Not on file     Minutes per session: Not on file     Stress: Not on file   Relationships     Social connections     Talks on phone: Not on file     Gets together: Not on file     Attends Rastafari service: Not on file     Active member of club or organization: Not on file     Attends meetings of clubs or organizations: Not on file     Relationship status: Not on file     Intimate partner violence     Fear of current or ex partner: Not on file     Emotionally abused: Not on file     Physically abused: Not on file     Forced sexual activity: Not on file   Other Topics Concern     Not on file   Social History Narrative     Not on file        Medication List:  Current Outpatient Medications   Medication Sig     acetaminophen (TYLENOL) 325 MG tablet Take 650 mg by mouth every 6 (six) hours as needed for pain.     aspirin 81 MG EC tablet Take 81 mg by mouth daily.     bimatoprost (LUMIGAN) 0.01 % Drop Administer 1 drop to both eyes daily.     brimonidine-timoloL (COMBIGAN) 0.2-0.5 % ophthalmic solution Administer 1 drop to both eyes 2 (two) times a day.      carboxymethylcellulose (REFRESH PLUS) 0.5 % Dpet ophthalmic dropperette Administer 2 drops to both eyes 4 (four) times a day.     cyanocobalamin, vitamin B-12, 1,000 mcg Subl Place 3,000 mcg under the tongue daily.      finasteride  (PROSCAR) 5 mg tablet Take 5 mg by mouth daily.     folic acid (FOLVITE) 1 MG tablet Take 1 mg by mouth daily.     omega-3 acid ethyl esters (LOVAZA) 1 gram capsule Take 1 g by mouth daily.     pyridoxine, vitamin B6, (B-6) 50 MG tablet Take 50 mg by mouth daily.     senna-docusate (SENNOSIDES-DOCUSATE SODIUM) 8.6-50 mg tablet Take 1 tablet by mouth 2 (two) times a day.     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      Physical Exam:  Note: COVID-19 pandemic precautions in place. Physical exam performed with social distancing considerations.  General: Patient is alert, pleasant elderly male, no distress.  Vitals: /75, Temp 97.4, Pulse 56, RR 18, O2 sat 98%RA.  HEENT: Head is NCAT. Eyes show no injection or icterus. Nares negative. Oropharynx well hydrated.  Neck: No JVD.  Lungs: Non labored respirations.  Abdomen: Soft, no tenderness on exam. Bowel sounds present. No guarding rebound or rigidity.  : Deferred.  Extremities: Mild LE edema is noted.  Musculoskeletal: CAM boot Right LE.   Skin: Warm and dry.   Psych: Mood appears good.      Labs:  Results for orders placed or performed in visit on 02/11/21   Basic Metabolic Panel   Result Value Ref Range    Sodium 138 136 - 145 mmol/L    Potassium 4.3 3.5 - 5.0 mmol/L    Chloride 108 (H) 98 - 107 mmol/L    CO2 26 22 - 31 mmol/L    Anion Gap, Calculation 4 (L) 5 - 18 mmol/L    Glucose 107 70 - 125 mg/dL    Calcium 8.3 (L) 8.5 - 10.5 mg/dL    BUN 26 8 - 28 mg/dL    Creatinine 0.83 0.70 - 1.30 mg/dL    GFR MDRD Af Amer >60 >60 mL/min/1.73m2    GFR MDRD Non Af Amer >60 >60 mL/min/1.73m2       Assessment/Plan:  1. Fall at home. Concern for syncopal event, no arrhythmia. No recurrence.  Continue to monitor. Here in TCU for strengthening, gait and ambulation.  2. Right lateral malleolus avulsion fracture. Secondary to the fall. Non operative. Ortho consulted, he has a CAM boot, JULIETA, to follow up with ortho 2 weeks post hospital.  3. BPH. Continue home finasteride. No new concerns.   4. Glaucoma. Baseline vision impairment. Continue usual drops.  5. Peripheral neuropathy. Takes supplements.  6. DM. Noted per records. Diet controlled, not on meds.  7. Code status is DNR.           Electronically signed by: Amber Marques MD

## 2021-06-15 NOTE — TELEPHONE ENCOUNTER
[facility] Nurse Triage Telephone Note      Provider: REGAN Luna  Facility: [facility] Type: TCU    Caller: Edilma  Call Back Number:  [phone]    Allergies: Lisinopril, Losartan, Penicillins, Strawberry, and Sulfa (sulfonamide antibiotics)    Reason for call: Nurse calling to report the Vitamin D level that was drawn yesterday.  Of note, patient does not take any supplemental calcium or Vitamin D.  Nurse is also requesting speech therapy to eval and treat for cognition.       Verbal Order/Direction given by Provider: Ok for speech therapy to eval and treat.      Provider giving order: REGAN Luna    Verbal order given to: Edilma Barboza RN

## 2021-06-15 NOTE — TELEPHONE ENCOUNTER
[facility] Nurse Triage Telephone Note      Provider: REGAN Luna  Facility: [facility] Type: TCU    Caller: Edilma  Call Back Number:  [phone]    Allergies: Lisinopril, Losartan, Penicillins, Strawberry, and Sulfa (sulfonamide antibiotics)    Reason for call: US results of lump side of neck: Hypoechoic superficial probable cyst.     Verbal Order/Direction given by Provider: Pt to follow up with Dermatology as out patient.    Provider giving order: REGAN Luna    Verbal order given to: Edilma Lane RN

## 2021-06-16 PROBLEM — S82.891D CLOSED FRACTURE OF RIGHT ANKLE WITH ROUTINE HEALING, SUBSEQUENT ENCOUNTER: Status: ACTIVE | Noted: 2021-02-10

## 2021-06-21 NOTE — LETTER
Letter by Karen Santacruz CNP at      Author: Karen Santacruz CNP Service: -- Author Type: --    Filed:  Encounter Date: 2/10/2021 Status: (Other)         Patient: Eliceo Pina   MR Number: [id number]   YOB: 1925   Date of Visit: 2/10/2021     [facility]    Facility:   [facility] [[id number]]   Code Status: DNR      CHIEF COMPLAINT/REASON FOR VISIT:  Chief Complaint   Patient presents with   ? Review Of Multiple Medical Conditions     review VS, labs, F/U right ankle fracture , emperatriz management and reports of burning with urination        HISTORY:      HPI: Eliceo is a 89 y.o. male undergoing physical and occupational therapy at [facility]. He is with past medical history of BPH, T2DM, peripheral neuropathy, and glaucoma who presents as a direct admission from [facility] ED for evaluation of fall with concern for syncopal event who was found to have avulsion fracture or the right ankle.  Head and spine CT without acute pathology.    Today he is seen to review vital signs, labs, follow-up right ankle fracture, and pain management.  Patient denied chest pain shortness of breath, denied constipation diarrhea nausea vomiting cough or congestion.  He reports his pain is controlled.  Staff reported he was having burning with urination however he has denied this today.  He is a type II diabetic and currently not on medications.      Past Medical History:   Diagnosis Date   ? Anemia     pernicious   ? Arthritis     Bilateral hips   ? Avulsion fracture of lateral malleolus of right fibula    ? BPH (benign prostatic hypertrophy)    ? Cataract    ? Diabetes mellitus (H)     Type 2 DM   ? Diverticulitis of colon    ? Glaucoma    ? History of GI bleed    ? Hypertension    ? Peripheral neuropathy              Family History   Problem Relation Age of Onset   ? Other Mother         sepsis    ? Hypertension Father    ? Stroke Father    ? Seizures Sister     ? Melanoma Brother    ? Parkinsonism Brother    ? Depression Brother    ? Pacemaker Brother      Social History     Socioeconomic History   ? Marital status:      Spouse name: Not on file   ? Number of children: Not on file   ? Years of education: Not on file   ? Highest education level: Not on file   Occupational History   ? Not on file   Social Needs   ? Financial resource strain: Not on file   ? Food insecurity     Worry: Not on file     Inability: Not on file   ? Transportation needs     Medical: Not on file     Non-medical: Not on file   Tobacco Use   ? Smoking status: Former Smoker     Quit date: 1945     Years since quittin.1   ? Smokeless tobacco: Never Used   Substance and Sexual Activity   ? Alcohol use: No   ? Drug use: No   ? Sexual activity: Not on file   Lifestyle   ? Physical activity     Days per week: Not on file     Minutes per session: Not on file   ? Stress: Not on file   Relationships   ? Social connections     Talks on phone: Not on file     Gets together: Not on file     Attends Rastafarian service: Not on file     Active member of club or organization: Not on file     Attends meetings of clubs or organizations: Not on file     Relationship status: Not on file   ? Intimate partner violence     Fear of current or ex partner: Not on file     Emotionally abused: Not on file     Physically abused: Not on file     Forced sexual activity: Not on file   Other Topics Concern   ? Not on file   Social History Narrative   ? Not on file         Review of Systems   Constitutional: Positive for activity change. Negative for appetite change, chills, fatigue and fever.        Right ankle fracture - wearing a CAM boot   HENT: Negative for congestion and sore throat.    Eyes: Negative for visual disturbance.   Respiratory: Negative for cough, shortness of breath and wheezing.    Cardiovascular: Negative for chest pain and leg swelling.   Gastrointestinal: Negative for abdominal distention,  abdominal pain, constipation, diarrhea and nausea.   Genitourinary: Negative for dysuria.   Musculoskeletal: Negative for arthralgias and myalgias.   Skin: Negative for color change, rash and wound.   Neurological: Negative for dizziness, weakness and numbness.   Psychiatric/Behavioral: Negative for agitation, behavioral problems and sleep disturbance.       Vitals:    02/10/21 1011   BP: 142/71   Pulse: (!) 58   Resp: 18   Temp: 98.7  F (37.1  C)   SpO2: 99%   Weight: 160 lb 12.8 oz (72.9 kg)       Physical Exam  Constitutional:       Appearance: He is well-developed.      Comments: Pleasant gentleman in no acuet distress   HENT:      Head: Normocephalic.   Eyes:      Conjunctiva/sclera: Conjunctivae normal.   Neck:      Musculoskeletal: Normal range of motion.   Cardiovascular:      Rate and Rhythm: Normal rate and regular rhythm.      Heart sounds: Normal heart sounds. No murmur.   Pulmonary:      Effort: No respiratory distress.      Breath sounds: Normal breath sounds. No wheezing or rales.   Abdominal:      General: Bowel sounds are normal. There is no distension.      Palpations: Abdomen is soft.      Tenderness: There is no abdominal tenderness.   Musculoskeletal: Normal range of motion.      Comments: CAM boot right foot    Skin:     General: Skin is warm.   Neurological:      Mental Status: He is alert and oriented to person, place, and time.   Psychiatric:         Behavior: Behavior normal.           LABS:   Recent Results (from the past 240 hour(s))   COVID-19 VIRUS (CORONAVIRUS) BY PCR - EXTERNAL RESULT    Specimen: Nasopharyngeal Swab    Specimen type and source: Swab (Source Required), Nasopharyngeal swab   Result Value Ref Range    COVID-19 Virus by PCR (External Result) Not Detected Not Detected   Basic Metabolic Panel   Result Value Ref Range    Sodium 138 136 - 145 mmol/L    Potassium 4.3 3.5 - 5.0 mmol/L    Chloride 108 (H) 98 - 107 mmol/L    CO2 26 22 - 31 mmol/L    Anion Gap, Calculation 4 (L)  5 - 18 mmol/L    Glucose 107 70 - 125 mg/dL    Calcium 8.3 (L) 8.5 - 10.5 mg/dL    BUN 26 8 - 28 mg/dL    Creatinine 0.83 0.70 - 1.30 mg/dL    GFR MDRD Af Amer >60 >60 mL/min/1.73m2    GFR MDRD Non Af Amer >60 >60 mL/min/1.73m2   Vitamin D, Total (25-Hydroxy)   Result Value Ref Range    Vitamin D, Total (25-Hydroxy) 80.7 (H) 30.0 - 80.0 ng/mL   HM2(CBC w/o Differential)   Result Value Ref Range    WBC 3.3 (L) 4.0 - 11.0 thou/uL    RBC 3.67 (L) 4.40 - 6.20 mill/uL    Hemoglobin 10.9 (L) 14.0 - 18.0 g/dL    Hematocrit 33.2 (L) 40.0 - 54.0 %    MCV 91 80 - 100 fL    MCH 29.7 27.0 - 34.0 pg    MCHC 32.8 32.0 - 36.0 g/dL    RDW 12.8 11.0 - 14.5 %    Platelets 105 (L) 140 - 440 thou/uL    MPV 11.4 8.5 - 12.5 fL   Glycosylated Hemoglobin A1C   Result Value Ref Range    Hemoglobin A1c 5.6 <=5.6 %     Current Outpatient Medications   Medication Sig   ? acetaminophen (TYLENOL) 325 MG tablet Take 650 mg by mouth every 6 (six) hours as needed for pain.   ? aspirin 81 MG EC tablet Take 81 mg by mouth daily.   ? brimonidine-timoloL (COMBIGAN) 0.2-0.5 % ophthalmic solution Administer 1 drop to both eyes 2 (two) times a day.    ? cyanocobalamin, vitamin B-12, 1,000 mcg Subl Place 3,000 mcg under the tongue daily.    ? finasteride (PROSCAR) 5 mg tablet Take 5 mg by mouth daily.   ? folic acid (FOLVITE) 1 MG tablet Take 1 mg by mouth daily.   ? omega-3 acid ethyl esters (LOVAZA) 1 gram capsule Take 1 g by mouth daily.   ? pyridoxine, vitamin B6, (B-6) 50 MG tablet Take 50 mg by mouth daily.   ? UNABLE TO FIND Med Name:Vit A beta carotene capsule 82715 unit one capsule daily for Vit A deficiency   ? bimatoprost (LUMIGAN) 0.01 % Drop Administer 1 drop to both eyes daily.   ? carboxymethylcellulose (REFRESH PLUS) 0.5 % Dpet ophthalmic dropperette Administer 2 drops to both eyes 4 (four) times a day.   ? senna-docusate (SENNOSIDES-DOCUSATE SODIUM) 8.6-50 mg tablet Take 1 tablet by mouth 2 (two) times a day.     ASSESSMENT:      ICD-10-CM     1. Closed fracture of right ankle with routine healing, subsequent encounter  S81.639S    2. Pain management  R52    3. Physical deconditioning  R53.81    4. Burning with urination  R30.0        PLAN:    Type 2 diabetes- not on medications A1C on 2/11/21 was 5.6     Peripheral neuropathy not on medications     BPH on Proscar    Glaucoma Combigan    Right ankle avulsion placed in a cam boot conservative management. Patient placed in CAM boot. F/U with orthopedics and podiatry    Pain management  PRN Tylenol    Anemia- on folic and B6 HGB on 2/11/21 10.9     Electronically signed by: Karen Santacruz CNP

## 2021-06-21 NOTE — LETTER
Letter by Amber Marques MD at      Author: Amber Marques MD Service: -- Author Type: --    Filed:  Encounter Date: 2/16/2021 Status: (Other)         Patient: Eliceo Pina   MR Number: [id number]   YOB: 1925   Date of Visit: 2/16/2021     [facility]      Facility:    [facility] [[id number]]  Code Status: DNR      Chief Complaint/Reason for Visit:  Chief Complaint   Patient presents with   ? H & P     MD Note to TCU for right ankle avulsion fx after a fall, possible syncope.        HPI:   Eliceo is a 89 y.o. male with hx of BPH, glaucoma, presented to the hospital on 2/6/2021 after a fall at home in which he reported he passed out, concern for syncopal event. He was noted to have right ankle avulsion fracture. He was admitted as noted below.     Hospital Course:   Brief HPI:   89 y.o. male with past medical history of BPH, T2DM, peripheral neuropathy, and glaucoma who presents as a direct admission from [facility] for evaluation of fall with concern for syncopal event who was found to have avulsion fracture or the right ankle. Patient was walking to his table this morning when he suffered a fall. Patient reports passing out. He denies any preceding symptoms such as dizziness, CP, HA or SOB. He does not think he hit his head, but is unsure. He denies use of blood thinners. He c/o back pain after falling. Patient reports that he had a syncopal event 3 years ago, but is unsure if much workup was done at that time. Per chart review it appears he had a 24 hour holter monitor, but I am unable to review the results for this in care everywhere.     On arrival to the ED patient was hypertensive to 150's, which subsequently improved. Other VSS. Afebrile. Per report EKG with NSR. Lab evaluation with stable BMP. CBC with WBC 2.5, otherwise stable. Troponin 0.02. CT head negative for acute pathology. CT cspine negative for acute injury. XR right  ankle with avulsion fracture of lateral malleolus. Patient was discussed with orthopedic surgery who recommended conservative management. Patient was placed in a CAM boot, and attempted ambulation trial. Patient noted to be very unsteady on his feet, and felt unsafe to return home. Patient was transferred to [facility] for admission to medicine, and ongoing cares.     Fall from standing  Syncope  Patient fell when walking to his table this morning. No preceding symptoms. Patient reports passing out.  EKG with NSR. Troponin 0.02. BMP stable. CBC with WBC 2.5, otherwise stable. CT head and cspine negative. Imaging of bilateral ankles, left arm and pelvis negative except for avulsion fracture to right ankle. Ankle injury non-operative, and patient was placed in CAM boot. Plan was to discharge home, however patient unsafe to ambulate independently at home.   --PT/OT evaluation. They suggested TCU  --Falls precautions.   --Telemetry monitoring no signs of arrhythmia    --No significant orthostatic BP      Right lateral malleolus avulsion fracture--XR right ankle with avulsion fracture. Orthopedic surgery consulted in the ED and recommending conservative management. Patient placed in CAM boot.   --Continue CAM boot.   --PT/OT evaluation.   --Follow up Ortho/Podiatry after dc       Chronic Issues:  T2DM--Patient reports he does not take any medications for this.   --ACcuchecks AC and HS  --SSI while hospitalized and minimal need of supplemental Insulin.  --Hypoglycemia protocol did not show any concern for hypoglycemia.      Peripheral neuropathy--patient reports hx of decreased sensation to BLE. No change. Reports he does not take medications for this.      BPH--patient reports taking medication for this, but does not remember name or dose. Will ask pharmacy to assist with med reconciliation.      Glaucoma--Patient reports taking eye gtts every night.     Overall stabilized and discharged to TCU on 2/9/2021 for PT, OT,  nursing cares, medical management and monitoring.     Today:  He has a CAM boot for right ankle, WBAT with boot on. Working with therapy. Denies any significant pain. Recommendations to follow up with ortho 2 weeks post hospital. He was evaluated for arrhythmia in the hospital, none noted. He denies feelings of dizziness or syncope here. No headaches, nausea, vomiting, chest pain or shortness of breath. He does have impaired vision from glaucoma, on eye drops, no new change in vision. Also hearing impaired, no new concerns. He lives alone at baseline, . He reports good appetite. No diarrhea, constipation abdominal pain. Hx of BPH on finasteride, no difficulty urinating. No fever or cough.       Past Medical History:  Past Medical History:   Diagnosis Date   ? Anemia     pernicious   ? Arthritis     Bilateral hips   ? Avulsion fracture of lateral malleolus of right fibula    ? BPH (benign prostatic hypertrophy)    ? Cataract    ? Diabetes mellitus (H)     Type 2 DM   ? Diverticulitis of colon    ? Glaucoma    ? History of GI bleed    ? Hypertension    ? Peripheral neuropathy            Surgical History:  Past Surgical History:   Procedure Laterality Date   ? CATARACT EXTRACTION W/  INTRAOCULAR LENS IMPLANT Left 6/18/2015    Procedure: LEFT CATARACT EXTRACTION BY PHACOEMULSIFICATION WITH AN INTRAOCULAR LENS IMPLANT;  Surgeon: Michel Lancaster MD;  Location: [facility];  Service:    ? CATARACT EXTRACTION W/  INTRAOCULAR LENS IMPLANT Right 11/5/2015    Procedure: RIGHT CATARACT EXTRACTION BY PHACOEMULSIFICATION WITH AN INTRAOCULAR LENS IMPLANT;  Surgeon: Michel Lancaster MD;  Location: [facility];  Service:    ? CHOLECYSTECTOMY     ? COLONOSCOPY     ? HERNIA REPAIR     ? AR REMV CATARACT EXTRACAP,INSERT LENS  6/18/2015        ? AR REMV CATARACT EXTRACAP,INSERT LENS  11/5/2015            Family History:   Family History   Problem Relation Age of Onset   ? Other Mother         sepsis    ? Hypertension Father     ? Stroke Father    ? Seizures Sister    ? Melanoma Brother    ? Parkinsonism Brother    ? Depression Brother    ? Pacemaker Brother        Social History:    Social History     Socioeconomic History   ? Marital status:      Spouse name: Not on file   ? Number of children: Not on file   ? Years of education: Not on file   ? Highest education level: Not on file   Occupational History   ? Not on file   Social Needs   ? Financial resource strain: Not on file   ? Food insecurity     Worry: Not on file     Inability: Not on file   ? Transportation needs     Medical: Not on file     Non-medical: Not on file   Tobacco Use   ? Smoking status: Former Smoker     Quit date: 1945     Years since quittin.2   ? Smokeless tobacco: Never Used   Substance and Sexual Activity   ? Alcohol use: No   ? Drug use: No   ? Sexual activity: Not on file   Lifestyle   ? Physical activity     Days per week: Not on file     Minutes per session: Not on file   ? Stress: Not on file   Relationships   ? Social connections     Talks on phone: Not on file     Gets together: Not on file     Attends Sikh service: Not on file     Active member of club or organization: Not on file     Attends meetings of clubs or organizations: Not on file     Relationship status: Not on file   ? Intimate partner violence     Fear of current or ex partner: Not on file     Emotionally abused: Not on file     Physically abused: Not on file     Forced sexual activity: Not on file   Other Topics Concern   ? Not on file   Social History Narrative   ? Not on file        Medication List:  Current Outpatient Medications   Medication Sig   ? acetaminophen (TYLENOL) 325 MG tablet Take 650 mg by mouth every 6 (six) hours as needed for pain.   ? aspirin 81 MG EC tablet Take 81 mg by mouth daily.   ? bimatoprost (LUMIGAN) 0.01 % Drop Administer 1 drop to both eyes daily.   ? brimonidine-timoloL (COMBIGAN) 0.2-0.5 % ophthalmic solution Administer 1 drop to both  eyes 2 (two) times a day.    ? carboxymethylcellulose (REFRESH PLUS) 0.5 % Dpet ophthalmic dropperette Administer 2 drops to both eyes 4 (four) times a day.   ? cyanocobalamin, vitamin B-12, 1,000 mcg Subl Place 3,000 mcg under the tongue daily.    ? finasteride (PROSCAR) 5 mg tablet Take 5 mg by mouth daily.   ? folic acid (FOLVITE) 1 MG tablet Take 1 mg by mouth daily.   ? omega-3 acid ethyl esters (LOVAZA) 1 gram capsule Take 1 g by mouth daily.   ? pyridoxine, vitamin B6, (B-6) 50 MG tablet Take 50 mg by mouth daily.   ? senna-docusate (SENNOSIDES-DOCUSATE SODIUM) 8.6-50 mg tablet Take 1 tablet by mouth 2 (two) times a day.   ? UNABLE TO FIND Med Name:Vit A beta carotene capsule 98511 unit one capsule daily for Vit A deficiency     Allergies:  Allergies   Allergen Reactions   ? Lisinopril    ? Losartan    ? Penicillins Hives   ? Strawberry Rash   ? Sulfa (Sulfonamide Antibiotics) Rash       Review of Systems:  Pertinent items are noted in HPI.      Physical Exam:  Note: COVID-19 pandemic precautions in place. Physical exam performed with social distancing considerations.  General: Patient is alert, pleasant elderly male, no distress.  Vitals: /75, Temp 97.4, Pulse 56, RR 18, O2 sat 98%RA.  HEENT: Head is NCAT. Eyes show no injection or icterus. Nares negative. Oropharynx well hydrated.  Neck: No JVD.  Lungs: Non labored respirations.  Abdomen: Soft, no tenderness on exam. Bowel sounds present. No guarding rebound or rigidity.  : Deferred.  Extremities: Mild LE edema is noted.  Musculoskeletal: CAM boot Right LE.   Skin: Warm and dry.   Psych: Mood appears good.      Labs:  Results for orders placed or performed in visit on 02/11/21   Basic Metabolic Panel   Result Value Ref Range    Sodium 138 136 - 145 mmol/L    Potassium 4.3 3.5 - 5.0 mmol/L    Chloride 108 (H) 98 - 107 mmol/L    CO2 26 22 - 31 mmol/L    Anion Gap, Calculation 4 (L) 5 - 18 mmol/L    Glucose 107 70 - 125 mg/dL    Calcium 8.3 (L) 8.5 -  10.5 mg/dL    BUN 26 8 - 28 mg/dL    Creatinine 0.83 0.70 - 1.30 mg/dL    GFR MDRD Af Amer >60 >60 mL/min/1.73m2    GFR MDRD Non Af Amer >60 >60 mL/min/1.73m2       Assessment/Plan:  1. Fall at home. Concern for syncopal event, no arrhythmia. No recurrence. Continue to monitor. Here in TCU for strengthening, gait and ambulation.  2. Right lateral malleolus avulsion fracture. Secondary to the fall. Non operative. Ortho consulted, he has a CAM boot, WBAT, to follow up with ortho 2 weeks post hospital.  3. BPH. Continue home finasteride. No new concerns.   4. Glaucoma. Baseline vision impairment. Continue usual drops.  5. Peripheral neuropathy. Takes supplements.  6. DM. Noted per records. Diet controlled, not on meds.  7. Code status is DNR.           Electronically signed by: Amber Marques MD

## 2021-06-21 NOTE — LETTER
Letter by Karen Santacruz CNP at      Author: Karen Santacruz CNP Service: -- Author Type: --    Filed:  Encounter Date: 2/15/2021 Status: (Other)         Patient: Eliceo Pina   MR Number: [id number]   YOB: 1925   Date of Visit: 2/15/2021     [facility]    Facility:   [facility] [[id number]]   Code Status: DNR      CHIEF COMPLAINT/REASON FOR VISIT:  Chief Complaint   Patient presents with   ? Review Of Multiple Medical Conditions     review Labs F/U right foot wound, monitormrightnneck lump        HISTORY:      HPI: Eliceo is a 89 y.o. male undergoing physical and occupational therapy at [facility]. He is with past medical history of BPH, T2DM, peripheral neuropathy, and glaucoma who presents as a direct admission from [facility] ED for evaluation of fall with concern for syncopal event who was found to have avulsion fracture or the right ankle.  Head and spine CT without acute pathology.    Today he is seen to review vital signs, labs, follow-up right ankle fracture, follow-up for reports of lump right side of neck and pain management.  Patient noted to have a lump on his right side of the neck.  He reports he has had it there for a couple of years and has had it drained in the past.  He denies any pain related to the lump.  Patient denied chest pain shortness of breath, denied constipation diarrhea nausea vomiting cough or congestion.  He reports his pain is controlled.  He is a type II diabetic and currently not on medications.  He reports his pain is controlled.  He does have numbness right foot and reports this is not new he was able to feel me touching his toes and can wiggle them.  He is participating in therapy.  He is with thrombocytopenia however labs are improving.      Past Medical History:   Diagnosis Date   ? Anemia     pernicious   ? Arthritis     Bilateral hips   ? Avulsion fracture of lateral malleolus of right  fibula    ? BPH (benign prostatic hypertrophy)    ? Cataract    ? Diabetes mellitus (H)     Type 2 DM   ? Diverticulitis of colon    ? Glaucoma    ? History of GI bleed    ? Hypertension    ? Peripheral neuropathy              Family History   Problem Relation Age of Onset   ? Other Mother         sepsis    ? Hypertension Father    ? Stroke Father    ? Seizures Sister    ? Melanoma Brother    ? Parkinsonism Brother    ? Depression Brother    ? Pacemaker Brother      Social History     Socioeconomic History   ? Marital status:      Spouse name: Not on file   ? Number of children: Not on file   ? Years of education: Not on file   ? Highest education level: Not on file   Occupational History   ? Not on file   Social Needs   ? Financial resource strain: Not on file   ? Food insecurity     Worry: Not on file     Inability: Not on file   ? Transportation needs     Medical: Not on file     Non-medical: Not on file   Tobacco Use   ? Smoking status: Former Smoker     Quit date: 1945     Years since quittin.1   ? Smokeless tobacco: Never Used   Substance and Sexual Activity   ? Alcohol use: No   ? Drug use: No   ? Sexual activity: Not on file   Lifestyle   ? Physical activity     Days per week: Not on file     Minutes per session: Not on file   ? Stress: Not on file   Relationships   ? Social connections     Talks on phone: Not on file     Gets together: Not on file     Attends Confucianist service: Not on file     Active member of club or organization: Not on file     Attends meetings of clubs or organizations: Not on file     Relationship status: Not on file   ? Intimate partner violence     Fear of current or ex partner: Not on file     Emotionally abused: Not on file     Physically abused: Not on file     Forced sexual activity: Not on file   Other Topics Concern   ? Not on file   Social History Narrative   ? Not on file         Review of Systems   Constitutional: Positive for activity change. Negative for  appetite change, chills, fatigue and fever.        Right ankle fracture - wearing a CAM boot   HENT: Negative for congestion and sore throat.    Eyes: Negative for visual disturbance.   Respiratory: Negative for cough, shortness of breath and wheezing.    Cardiovascular: Negative for chest pain and leg swelling.   Gastrointestinal: Negative for abdominal distention, abdominal pain, constipation, diarrhea and nausea.   Genitourinary: Negative for dysuria.   Musculoskeletal: Negative for arthralgias and myalgias.   Skin: Negative for color change, rash and wound.   Neurological: Negative for dizziness, weakness and numbness.   Psychiatric/Behavioral: Negative for agitation, behavioral problems and sleep disturbance.       Vitals:    02/15/21 1107   BP: 148/82   Pulse: (!) 55   Resp: 18   Temp: 98.2  F (36.8  C)   SpO2: 98%   Weight: 160 lb 12.8 oz (72.9 kg)       Physical Exam  Constitutional:       Appearance: He is well-developed.      Comments: Pleasant gentleman in no acuet distress   HENT:      Head: Normocephalic.   Eyes:      Conjunctiva/sclera: Conjunctivae normal.   Neck:      Musculoskeletal: Normal range of motion.   Cardiovascular:      Rate and Rhythm: Normal rate and regular rhythm.      Heart sounds: Normal heart sounds. No murmur.   Pulmonary:      Effort: No respiratory distress.      Breath sounds: Normal breath sounds. No wheezing or rales.   Abdominal:      General: Bowel sounds are normal. There is no distension.      Palpations: Abdomen is soft.      Tenderness: There is no abdominal tenderness.   Musculoskeletal: Normal range of motion.      Comments: CAM boot right foot    Skin:     General: Skin is warm.      Comments: Lump right side of neck   Neurological:      Mental Status: He is alert and oriented to person, place, and time.   Psychiatric:         Behavior: Behavior normal.           LABS:   Recent Results (from the past 240 hour(s))   COVID-19 VIRUS (CORONAVIRUS) BY PCR - EXTERNAL RESULT     Specimen: Nasopharyngeal Swab    Specimen type and source: Swab (Source Required), Nasopharyngeal swab   Result Value Ref Range    COVID-19 Virus by PCR (External Result) Not Detected Not Detected   Basic Metabolic Panel   Result Value Ref Range    Sodium 138 136 - 145 mmol/L    Potassium 4.3 3.5 - 5.0 mmol/L    Chloride 108 (H) 98 - 107 mmol/L    CO2 26 22 - 31 mmol/L    Anion Gap, Calculation 4 (L) 5 - 18 mmol/L    Glucose 107 70 - 125 mg/dL    Calcium 8.3 (L) 8.5 - 10.5 mg/dL    BUN 26 8 - 28 mg/dL    Creatinine 0.83 0.70 - 1.30 mg/dL    GFR MDRD Af Amer >60 >60 mL/min/1.73m2    GFR MDRD Non Af Amer >60 >60 mL/min/1.73m2   Vitamin D, Total (25-Hydroxy)   Result Value Ref Range    Vitamin D, Total (25-Hydroxy) 80.7 (H) 30.0 - 80.0 ng/mL   HM2(CBC w/o Differential)   Result Value Ref Range    WBC 3.3 (L) 4.0 - 11.0 thou/uL    RBC 3.67 (L) 4.40 - 6.20 mill/uL    Hemoglobin 10.9 (L) 14.0 - 18.0 g/dL    Hematocrit 33.2 (L) 40.0 - 54.0 %    MCV 91 80 - 100 fL    MCH 29.7 27.0 - 34.0 pg    MCHC 32.8 32.0 - 36.0 g/dL    RDW 12.8 11.0 - 14.5 %    Platelets 105 (L) 140 - 440 thou/uL    MPV 11.4 8.5 - 12.5 fL   Glycosylated Hemoglobin A1C   Result Value Ref Range    Hemoglobin A1c 5.6 <=5.6 %     Current Outpatient Medications   Medication Sig   ? acetaminophen (TYLENOL) 325 MG tablet Take 650 mg by mouth every 6 (six) hours as needed for pain.   ? aspirin 81 MG EC tablet Take 81 mg by mouth daily.   ? bimatoprost (LUMIGAN) 0.01 % Drop Administer 1 drop to both eyes daily.   ? brimonidine-timoloL (COMBIGAN) 0.2-0.5 % ophthalmic solution Administer 1 drop to both eyes 2 (two) times a day.    ? carboxymethylcellulose (REFRESH PLUS) 0.5 % Dpet ophthalmic dropperette Administer 2 drops to both eyes 4 (four) times a day.   ? cyanocobalamin, vitamin B-12, 1,000 mcg Subl Place 3,000 mcg under the tongue daily.    ? finasteride (PROSCAR) 5 mg tablet Take 5 mg by mouth daily.   ? folic acid (FOLVITE) 1 MG tablet Take 1 mg by  mouth daily.   ? omega-3 acid ethyl esters (LOVAZA) 1 gram capsule Take 1 g by mouth daily.   ? pyridoxine, vitamin B6, (B-6) 50 MG tablet Take 50 mg by mouth daily.   ? senna-docusate (SENNOSIDES-DOCUSATE SODIUM) 8.6-50 mg tablet Take 1 tablet by mouth 2 (two) times a day.   ? 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 Lump in neck  R22.1     rightside   2. Closed fracture of right ankle with routine healing, subsequent encounter  S82.601F    3. Physical deconditioning  R53.81    4. Pain management  R52        PLAN:    Lump right side of neck ultrasound    Type 2 diabetes- not on medications A1C on 2/11/21 was 5.6     Peripheral neuropathy not on medications     BPH on Proscar    Glaucoma Combigan    Right ankle avulsion placed in a cam boot conservative management. Patient placed in CAM boot. F/U with orthopedics and podiatry    Pain management  PRN Tylenol    Anemia- on folic and B6 HGB on 2/11/21 10.9     Electronically signed by: Karen Santacruz CNP

## 2021-06-21 NOTE — LETTER
Letter by Karen Santacruz CNP at      Author: Karen Santacruz CNP Service: -- Author Type: --    Filed:  Encounter Date: 2/22/2021 Status: (Other)         Patient: Eliceo Pina   MR Number: [id number]   YOB: 1925   Date of Visit: 2/22/2021     [facility]    Facility:   [facility] [[id number]]   Code Status: DNR      CHIEF COMPLAINT/REASON FOR VISIT:  Chief Complaint   Patient presents with   ? Review Of Multiple Medical Conditions     review VS, labs, F/U right foot fracture        HISTORY:      HPI: Eliceo is a 89 y.o. male undergoing physical and occupational therapy at [facility]. He is with past medical history of BPH, T2DM, peripheral neuropathy, and glaucoma who presents as a direct admission from [facility] for evaluation of fall with concern for syncopal event who was found to have avulsion fracture or the right ankle.  Head and spine CT without acute pathology.    Today he is seen to review vital signs, labs, follow-up right ankle fracture,and pain management.   Patient denied chest pain shortness of breath, denied constipation diarrhea nausea vomiting cough or congestion.  He reports his pain is controlled.  He does report feeling weak.  Appetite is good he is eating 100%.  He is a type II diabetic and currently not on medications. He does have numbness right foot and reports this is not new he was able to feel me touching his toes and can wiggle them.  He is participating in therapy.  He is with thrombocytopenia however labs are improving. Last HGB 2/11/21 was 10.9.       Past Medical History:   Diagnosis Date   ? Anemia     pernicious   ? Arthritis     Bilateral hips   ? Avulsion fracture of lateral malleolus of right fibula    ? BPH (benign prostatic hypertrophy)    ? Cataract    ? Diabetes mellitus (H)     Type 2 DM   ? Diverticulitis of colon    ? Glaucoma    ? History of GI bleed    ? Hypertension    ? Peripheral  neuropathy              Family History   Problem Relation Age of Onset   ? Other Mother         sepsis    ? Hypertension Father    ? Stroke Father    ? Seizures Sister    ? Melanoma Brother    ? Parkinsonism Brother    ? Depression Brother    ? Pacemaker Brother      Social History     Socioeconomic History   ? Marital status:      Spouse name: Not on file   ? Number of children: Not on file   ? Years of education: Not on file   ? Highest education level: Not on file   Occupational History   ? Not on file   Social Needs   ? Financial resource strain: Not on file   ? Food insecurity     Worry: Not on file     Inability: Not on file   ? Transportation needs     Medical: Not on file     Non-medical: Not on file   Tobacco Use   ? Smoking status: Former Smoker     Quit date: 1945     Years since quittin.1   ? Smokeless tobacco: Never Used   Substance and Sexual Activity   ? Alcohol use: No   ? Drug use: No   ? Sexual activity: Not on file   Lifestyle   ? Physical activity     Days per week: Not on file     Minutes per session: Not on file   ? Stress: Not on file   Relationships   ? Social connections     Talks on phone: Not on file     Gets together: Not on file     Attends Worship service: Not on file     Active member of club or organization: Not on file     Attends meetings of clubs or organizations: Not on file     Relationship status: Not on file   ? Intimate partner violence     Fear of current or ex partner: Not on file     Emotionally abused: Not on file     Physically abused: Not on file     Forced sexual activity: Not on file   Other Topics Concern   ? Not on file   Social History Narrative   ? Not on file         Review of Systems   Constitutional: Positive for activity change. Negative for appetite change, chills, fatigue and fever.        Right ankle fracture - wearing a CAM boot   HENT: Negative for congestion and sore throat.    Eyes: Negative for visual disturbance.   Respiratory:  Negative for cough, shortness of breath and wheezing.    Cardiovascular: Negative for chest pain and leg swelling.   Gastrointestinal: Negative for abdominal distention, abdominal pain, constipation, diarrhea and nausea.   Genitourinary: Negative for dysuria.   Musculoskeletal: Negative for arthralgias and myalgias.   Skin: Negative for color change, rash and wound.   Neurological: Negative for dizziness, weakness and numbness.   Psychiatric/Behavioral: Negative for agitation, behavioral problems and sleep disturbance.       Vitals:    02/22/21 1026   BP: 139/74   Pulse: 64   Resp: 18   Temp: 98.2  F (36.8  C)   SpO2: 98%   Weight: 160 lb 14.4 oz (73 kg)       Physical Exam  Constitutional:       Appearance: He is well-developed.      Comments: Pleasant gentleman in no acuet distress   HENT:      Head: Normocephalic.   Eyes:      Conjunctiva/sclera: Conjunctivae normal.   Neck:      Musculoskeletal: Normal range of motion.   Cardiovascular:      Rate and Rhythm: Normal rate and regular rhythm.      Heart sounds: Normal heart sounds. No murmur.   Pulmonary:      Effort: No respiratory distress.      Breath sounds: Normal breath sounds. No wheezing or rales.   Abdominal:      General: Bowel sounds are normal. There is no distension.      Palpations: Abdomen is soft.      Tenderness: There is no abdominal tenderness.   Musculoskeletal: Normal range of motion.      Comments: CAM boot right foot    Skin:     General: Skin is warm.      Comments: Lump right side of neck   Neurological:      Mental Status: He is alert and oriented to person, place, and time.   Psychiatric:         Behavior: Behavior normal.           LABS:   No results found for this or any previous visit (from the past 240 hour(s)).  Current Outpatient Medications   Medication Sig   ? acetaminophen (TYLENOL) 325 MG tablet Take 650 mg by mouth every 6 (six) hours as needed for pain.   ? aspirin 81 MG EC tablet Take 81 mg by mouth daily.   ? bimatoprost  (LUMIGAN) 0.01 % Drop Administer 1 drop to both eyes daily.   ? brimonidine-timoloL (COMBIGAN) 0.2-0.5 % ophthalmic solution Administer 1 drop to both eyes 2 (two) times a day.    ? carboxymethylcellulose (REFRESH PLUS) 0.5 % Dpet ophthalmic dropperette Administer 2 drops to both eyes 4 (four) times a day.   ? cyanocobalamin, vitamin B-12, 1,000 mcg Subl Place 3,000 mcg under the tongue daily.    ? finasteride (PROSCAR) 5 mg tablet Take 5 mg by mouth daily.   ? folic acid (FOLVITE) 1 MG tablet Take 1 mg by mouth daily.   ? omega-3 acid ethyl esters (LOVAZA) 1 gram capsule Take 1 g by mouth daily.   ? pyridoxine, vitamin B6, (B-6) 50 MG tablet Take 50 mg by mouth daily.   ? senna-docusate (SENNOSIDES-DOCUSATE SODIUM) 8.6-50 mg tablet Take 1 tablet by mouth 2 (two) times a day.   ? 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 Closed fracture of right ankle with routine healing, subsequent encounter  S82.891D    2. Physical deconditioning  R53.81    3. Pain management  R52    4. Weakness  R53.1        PLAN:    Lump right side of neck ultrasound shows superficial cyst- follow up with dermatology outpatient     Type 2 diabetes- not on medications A1C on 2/11/21 was 5.6     Peripheral neuropathy not on medications     BPH on Proscar    Glaucoma Combigan    Right ankle avulsion placed in a cam boot conservative management. Patient placed in CAM boot. F/U with orthopedics and podiatry    Pain management  PRN Tylenol    Anemia- on folic and B6 HGB on 2/11/21 10.9, monitor labs     Electronically signed by: Karen Santacruz CNP

## 2021-06-21 NOTE — LETTER
Letter by Karen Santacruz CNP at      Author: Karen Santacruz CNP Service: -- Author Type: --    Filed:  Encounter Date: 2/24/2021 Status: (Other)         Patient: Eliceo Pina   MR Number: [id number]   YOB: 1925   Date of Visit: 2/24/2021     [facility]    Facility:   [facility] [[id number]]   Code Status: DNR  PCP: Grisel Tillman MD   Phone: None   Fax: None      CHIEF COMPLAINT/REASON FOR VISIT:  Chief Complaint   Patient presents with   ? Discharge Summary       HISTORY COURSE:  Eliceo is a 89 y.o. male undergoing physical and occupational therapy at [facility]. He is with past medical history of BPH, T2DM, peripheral neuropathy, and glaucoma who presents as a direct admission from [facility] ED for evaluation of fall with concern for syncopal event who was found to have avulsion fracture or the right ankle.  Head and spine CT without acute pathology.     Today he is seen to review vital signs, , follow-up right ankle fracture pain management and face-to-face for discharge patient will discharge to home on 2/26/2021 with current medications and treatments.  He will have [facility] home care services PT OT home health aide RN and speech.  He is standing for 5 minutes he does have a cam boot  right  foot  Patient denied chest pain shortness of breath, denied constipation diarrhea nausea vomiting cough or congestion.  He reports his pain is controlled.  Appetite is good he is eating 100%.  He is a type II diabetic and currently not on medications. He does have numbness right foot and reports this is not new.   He is participating in therapy.  He is with thrombocytopenia however labs are improving. Last HGB 2/11/21 was 10.9.     Review of Systems  Constitutional: Positive for activity change. Negative for appetite change, chills, fatigue and fever.        Right ankle fracture - wearing a CAM boot   HENT: Negative for congestion and sore  throat.    Eyes: Negative for visual disturbance.   Respiratory: Negative for cough, shortness of breath and wheezing.    Cardiovascular: Negative for chest pain and leg swelling.   Gastrointestinal: Negative for abdominal distention, abdominal pain, constipation, diarrhea and nausea.   Genitourinary: Negative for dysuria.   Musculoskeletal: Negative for arthralgias and myalgias.   Skin: Negative for color change, rash and wound.   Neurological: Negative for dizziness, weakness and numbness.   Psychiatric/Behavioral: Negative for agitation, behavioral problems and sleep disturbance.      Vitals:    02/24/21 1028   BP: 155/87   Pulse: 66   Resp: 18   Temp: 99.1  F (37.3  C)   SpO2: 96%   Weight: 160 lb 14.4 oz (73 kg)       Physical Exam  Constitutional:       Appearance: He is well-developed.      Comments: Pleasant gentleman in no acuet distress   HENT:      Head: Normocephalic.   Eyes:      Conjunctiva/sclera: Conjunctivae normal.   Neck:      Musculoskeletal: Normal range of motion.   Cardiovascular:      Rate and Rhythm: Normal rate and regular rhythm.      Heart sounds: Normal heart sounds. No murmur.   Pulmonary:      Effort: No respiratory distress.      Breath sounds: Normal breath sounds. No wheezing or rales.   Abdominal:      General: Bowel sounds are normal. There is no distension.      Palpations: Abdomen is soft.      Tenderness: There is no abdominal tenderness.   Musculoskeletal: Normal range of motion.      Comments: CAM boot right foot    Skin:     General: Skin is warm.      Comments: Lump right side of neck   Neurological:      Mental Status: He is alert and oriented to person, place, and time.   Psychiatric:         Behavior: Behavior normal.   MEDICATION LIST:  Current Outpatient Medications   Medication Sig   ? acetaminophen (TYLENOL) 325 MG tablet Take 650 mg by mouth every 6 (six) hours as needed for pain.   ? aspirin 81 MG EC tablet Take 81 mg by mouth daily.   ? bimatoprost (LUMIGAN) 0.01  % Drop Administer 1 drop to both eyes daily.   ? brimonidine-timoloL (COMBIGAN) 0.2-0.5 % ophthalmic solution Administer 1 drop to both eyes 2 (two) times a day.    ? carboxymethylcellulose (REFRESH PLUS) 0.5 % Dpet ophthalmic dropperette Administer 2 drops to both eyes 4 (four) times a day.   ? cyanocobalamin, vitamin B-12, 1,000 mcg Subl Place 3,000 mcg under the tongue daily.    ? finasteride (PROSCAR) 5 mg tablet Take 5 mg by mouth daily.   ? folic acid (FOLVITE) 1 MG tablet Take 1 mg by mouth daily.   ? omega-3 acid ethyl esters (LOVAZA) 1 gram capsule Take 1 g by mouth daily.   ? pyridoxine, vitamin B6, (B-6) 50 MG tablet Take 50 mg by mouth daily.   ? senna-docusate (SENNOSIDES-DOCUSATE SODIUM) 8.6-50 mg tablet Take 1 tablet by mouth 2 (two) times a day.   ? UNABLE TO FIND Med Name:Vit A beta carotene capsule 95968 unit one capsule daily for Vit A deficiency       DISCHARGE DIAGNOSIS:    ICD-10-CM    1. Closed fracture of right ankle with routine healing, subsequent encounter  S82.891D    2. Pain management  R52         Lump right side of neck ultrasound shows superficial cyst- follow up with dermatology outpatient      Type 2 diabetes- not on medications A1C on 2/11/21 was 5.6     Peripheral neuropathy not on medications     BPH on Proscar    Glaucoma Combigan     Right ankle avulsion placed in a cam boot conservative management. Patient placed in CAM boot. F/U with orthopedics and podiatry     Pain management  PRN Tylenol     Anemia- on folic and B6 HGB on 2/11/21 10.9, monitor labs     MEDICAL EQUIPMENT NEEDS:  [employer]     DISCHARGE PLAN/FACE TO FACE:  I certify that services are/were furnished while this patient was under the care of a physician and that a physician or an allowed non-physician practitioner (NPP), had a face-to-face encounter that meets the physician face-to-face encounter requirements. The encounter was in whole, or in part, related to the primary reason for home health. The patient is  confined to his/her home and needs intermittent skilled nursing, physical therapy, speech-language pathology, or the continued need for occupational therapy. A plan of care has been established by a physician and is periodically reviewed by a physician.  Date of Face-to-Face Encounter: 2/24/21    I certify that, based on my findings, the following services are medically necessary home health services: PT OT home health aide RN and  and speech    My clinical findings support the need for the above skilled services because: PT OT for continued strength and endurance, home health aide to assist with activities of daily living, RN for medication management vital signs, and monitoring of CMS right foot, speech for cognition.    This patient is homebound because: He is deconditioned following a right ankle fracture    The patient is, or has been, under my care and I have initiated the establishment of the plan of care. This patient will be followed by a physician who will periodically review the plan of care.    Schedule follow up visit with primary care provider within 7 days to reestablish care.    Electronically signed by: Karen Santacruz CNP